# Patient Record
Sex: MALE | Race: OTHER | NOT HISPANIC OR LATINO | URBAN - METROPOLITAN AREA
[De-identification: names, ages, dates, MRNs, and addresses within clinical notes are randomized per-mention and may not be internally consistent; named-entity substitution may affect disease eponyms.]

---

## 2023-06-07 ENCOUNTER — OUTPATIENT (OUTPATIENT)
Dept: OUTPATIENT SERVICES | Facility: HOSPITAL | Age: 53
LOS: 1 days | End: 2023-06-07
Payer: COMMERCIAL

## 2023-06-07 DIAGNOSIS — K02.9 DENTAL CARIES, UNSPECIFIED: ICD-10-CM

## 2023-06-07 PROCEDURE — D0220: CPT

## 2023-06-07 PROCEDURE — D0140: CPT

## 2023-06-08 DIAGNOSIS — K02.9 DENTAL CARIES, UNSPECIFIED: ICD-10-CM

## 2023-08-29 ENCOUNTER — OUTPATIENT (OUTPATIENT)
Dept: OUTPATIENT SERVICES | Facility: HOSPITAL | Age: 53
LOS: 1 days | End: 2023-08-29

## 2023-08-29 DIAGNOSIS — K02.9 DENTAL CARIES, UNSPECIFIED: ICD-10-CM

## 2023-08-29 PROCEDURE — D0140: CPT

## 2023-08-29 PROCEDURE — D0330: CPT

## 2023-08-30 DIAGNOSIS — K02.9 DENTAL CARIES, UNSPECIFIED: ICD-10-CM

## 2024-04-18 ENCOUNTER — APPOINTMENT (OUTPATIENT)
Dept: PSYCHIATRY | Facility: CLINIC | Age: 54
End: 2024-04-18

## 2024-04-18 ENCOUNTER — OUTPATIENT (OUTPATIENT)
Dept: OUTPATIENT SERVICES | Facility: HOSPITAL | Age: 54
LOS: 1 days | End: 2024-04-18
Payer: MEDICAID

## 2024-04-18 DIAGNOSIS — F41.1 GENERALIZED ANXIETY DISORDER: ICD-10-CM

## 2024-04-18 PROBLEM — Z00.00 ENCOUNTER FOR PREVENTIVE HEALTH EXAMINATION: Status: ACTIVE | Noted: 2024-04-18

## 2024-04-18 PROCEDURE — 90853 GROUP PSYCHOTHERAPY: CPT

## 2024-04-18 PROCEDURE — 90791 PSYCH DIAGNOSTIC EVALUATION: CPT | Mod: XP
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
## 2024-04-19 DIAGNOSIS — F41.1 GENERALIZED ANXIETY DISORDER: ICD-10-CM

## 2024-04-22 NOTE — DISCUSSION/SUMMARY
[___ times a week] : [unfilled] time(s) a week [60 minutes] : 60 minutes [de-identified] : Intensive Outpatient Program [FreeTextEntry8] : To provide a supportive environment, develop healthy coping skills, which will aid in overall functioning and well being.  [FreeTextEntry4] : Behavior in group:  [x] Showed insight.  [x] Showed interest.  [x] Showed leadership.  [x] Active in discussion  [x] Offered constructive input.  [x] Supportive to others  [] Not supportive to others  [] No apparent interest  [] Quietly engaged.  [] Withdrawn  [] Appeared distracted.  [] Disruptive     INDIVIDUAL'S MOOD:  [] Stable  [x] Depressed/Sad  [] Angry  [x] Anxious  [] Other     NEW ISSUES/STRESSORS/EXTRAORDINARY EVENTS PRESENTED TODAY:  [] New issue resolved, No Update Required  [] New issue, Treatment Plan Update Required  [x] None Reported [de-identified] : Pt was seen today for IOP group in person, pt is axo4, calm, pleasant, and appears well groomed. Pt was active in group discussion and provided insight and support to fellow group members. Pt introduced himself to staff and fellow group members and shared personal experiences, struggles and his motivation to "pursue a healthier life."

## 2024-04-23 ENCOUNTER — APPOINTMENT (OUTPATIENT)
Dept: PSYCHIATRY | Facility: CLINIC | Age: 54
End: 2024-04-23

## 2024-04-25 ENCOUNTER — APPOINTMENT (OUTPATIENT)
Dept: PSYCHIATRY | Facility: CLINIC | Age: 54
End: 2024-04-25

## 2024-04-25 ENCOUNTER — OUTPATIENT (OUTPATIENT)
Dept: OUTPATIENT SERVICES | Facility: HOSPITAL | Age: 54
LOS: 1 days | End: 2024-04-25
Payer: MEDICAID

## 2024-04-25 DIAGNOSIS — F41.1 GENERALIZED ANXIETY DISORDER: ICD-10-CM

## 2024-04-25 PROCEDURE — 90853 GROUP PSYCHOTHERAPY: CPT | Mod: XP

## 2024-04-26 NOTE — DISCUSSION/SUMMARY
[___ times a week] : [unfilled] time(s) a week [60 minutes] : 60 minutes [de-identified] : Intensive Outpatient Program [FreeTextEntry8] : To provide a supportive environment, develop healthy coping skills, which will aid in overall functioning and well being. [FreeTextEntry4] : Behavior in group:  [x] Showed insight.  [x] Showed interest.  [] Showed leadership.  [x] Active in discussion  [x] Offered constructive input.  [x] Supportive to others  [] Not supportive to others  [] No apparent interest  [] Quietly engaged.  [] Withdrawn  [] Appeared distracted.  [] Disruptive     INDIVIDUAL'S MOOD:  [] Stable  [x] Depressed/Sad  [] Angry  [x] Anxious  [] Other     NEW ISSUES/STRESSORS/EXTRAORDINARY EVENTS PRESENTED TODAY:  [] New issue resolved, No Update Required  [] New issue, Treatment Plan Update Required  [x] None Reported    [de-identified] : Pt was seen today for IOP group in person, pt is axo4, calm, pleasant, and appears well groomed. Pt was active in group discussion and provided insight and support to fellow group members.

## 2024-04-30 ENCOUNTER — APPOINTMENT (OUTPATIENT)
Dept: PSYCHIATRY | Facility: CLINIC | Age: 54
End: 2024-04-30
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
## 2024-05-06 NOTE — FAMILY HISTORY
[FreeTextEntry1] : Family composition: [ 7motth old daughter, Gerri mother, 21-year-old son; 5 brothers 1 sister]  Family history and background [ Born in [place], [place], lived in [place] since 6 years old. ] Family relationship [ Poor Relationship] Pertinent Family Medical, MH and Substance Use History including Adult Child of Alcoholic and child of substance abuse status; history of cancer and heart disease [ History of bi-polar disorder on maternal side of family]

## 2024-05-06 NOTE — REASON FOR VISIT
[Community Based Organization] : Community Based Organization [Patient] : Patient [Collateral - Name/Contact Info/Relationship:___] : Collateral: [unfilled] [FreeTextEntry4] : 321lc [FreeTextEntry5] : 10am [FreeTextEntry2] : Depression, anger and Mood swings.   [FreeTextEntry1] :  support for feelings of depression, homelessness, and anger

## 2024-05-06 NOTE — SOCIAL HISTORY
[FreeTextEntry1] : Legal Status  Does individual Served have a Legal Guardian, rep Payee or Conservatorship? [ X] No [ ] Yes - Details: [ ]  Legal Involvement history Does the individual have a history of or current involvement with the legal system? [X ] No [ ] Yes - Details: [ ]   Services Have you ever served in the ? [X ] No [ ] Yes - Details: [ ]  Employment history [ last worked less than a year ]  Developmental history [ ]  Sexual hx/identity Sexual History/ Concern (include sexual orientation and other relevant information)  [ heterosexual male ]  Race - ethnicity - culture information [  American Iltialian ]   Social supports (friends, Volunteers, club, AA meeting, other meetings ) ? [ [facility]; ]  Meaningful Activities: [ Fishing, baseball; sports ]   Spiritual Assessment Tool - TIN LAWSON What is your vy or belief? [ Born again Episcopal] Do you consider yourself spiritual or Rastafari? [ Spiritual ] Is there something you believe in that gives meaning to your life? [ ] I: Is it important in your life? [ ] What influence does it have on how you take care of yourself? [ Try to stay positive, going to program for support] How have your beliefs influenced your behavior during this illness? What role do your beliefs play in regaining your health? [ ] C. Are you part of a spiritual or Rastafari community? [ ] Is this of support to you and how? [ ] Is there a person or group of people you really love or who are really important to you? [ all the girls in my family, daughter, child's mother and mother ] H. We have been discussing your belief and supports. What else gives you internal support?[ doing what's right for my family] What are your sources of hope, strength, comfort and peace? [ being humble and grateful for being alive dispte not having everything ] What do you hold on to during difficult times? [ Hold my breathe] what sustains you and keeps you going? [ belief in God and my family] A. How would you like me, your healthcare provider, to address these issues in your healthcare? [to help me get better ]  SMOKING CESSATION Do you Smoke?                              [ ] Yes [ X] No Do you want to quit? [ ] Yes [ ] No -ASK  Number of cigatettes   [ ] cigars [ ]  pipe bowls [ ]  per day  Number of ST cans/ pouches per week [ ]  Number of years used [ ]  How soon after you wake up do you use tobacco?  [ ] within 30 minutes      [ ] more than 30 minutes  Previous quit attempts:  # of attempts [ ] longest quit period [ ] methods(s) used [ ] How long ago was last attempt to quit  [ ] years [ ] months  Reasons for wanting to quit[ ] -ADVISE   about the oral benefits of quitting -ASSESS   willingness to make a quit attempt (Stage of Change)    [ ] Precontemplation (Stop here & Reassess next visit) [ ] Contemplation [ ] Preparation   -ASSIST    (depending on stage of change)  Self-Help Pamphlets & Materials  List of local community group/individual quit programs and phone help lines  Encourage a quit date (for those who are ready)  Pharmacotherapy: Nicotine gum/ Lozenge/ Patch/ Inhaler/NS/Zyban/ Chantix   RX: [ ]  () -ARRANGE  Follow up if set a quit date (with permission) Quit Date: [ ]  Phone calls or visits:  [ ] Week 1-2  Months   [ ] 1   [ ] 3   [ ] 6   [ ] 12   -Yearly Reassessment    [ ] No Changes of Smoking [ ] Change of Smoking Habit (Non-Smoker to Smoker/ Smoker to Non Smoker) (If there is change from Non Smoker to Smoker, please fill out new BRIEF TOBACCO CESSATION INTERVENTION FORM) Date of Yearly Reassessment : [ ] Comments:  [ ]
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
## 2024-05-07 ENCOUNTER — OUTPATIENT (OUTPATIENT)
Dept: OUTPATIENT SERVICES | Facility: HOSPITAL | Age: 54
LOS: 1 days | End: 2024-05-07
Payer: MEDICAID

## 2024-05-07 ENCOUNTER — APPOINTMENT (OUTPATIENT)
Dept: PSYCHIATRY | Facility: CLINIC | Age: 54
End: 2024-05-07

## 2024-05-07 DIAGNOSIS — F41.1 GENERALIZED ANXIETY DISORDER: ICD-10-CM

## 2024-05-07 PROCEDURE — 90853 GROUP PSYCHOTHERAPY: CPT

## 2024-05-07 NOTE — DISCUSSION/SUMMARY
[de-identified] : Intensive Outpatient Program [FreeTextEntry8] : To provide a supportive environment, develop healthy coping skills, which will aid in overall functioning and well being.   [FreeTextEntry4] : [x] Showed insight.  [x] Showed interest.  [x] Showed leadership.  [x] Active in discussion  [x] Offered constructive input.  [x] Supportive to others  [] Not supportive to others  [] No apparent interest  [] Quietly engaged.  [] Withdrawn  [] Appeared distracted.  [] Disruptive    INDIVIDUAL'S MOOD:  [] Stable  [x] Depressed/Sad  [] Angry  [x] Anxious  [] Other    NEW ISSUES/STRESSORS/EXTRAORDINARY EVENTS PRESENTED TODAY:  [] New issue resolved, No Update Required  [] New issue, Treatment Plan Update Required  [x] None Reported   [de-identified] : Robert participated in group while expressing himself in an appropriate manner.  Pt. spoke about his family history and current motivation to care for his daughter.  Robert was forthcoming with information and seemed receptive to feedback.

## 2024-05-07 NOTE — DISCUSSION/SUMMARY
[de-identified] : Intensive Outpatient Program [FreeTextEntry8] : To provide a supportive environment, develop healthy coping skills, which will aid in overall functioning and well-being. [FreeTextEntry4] : [x] Showed insight.  [x] Showed interest.  [x] Showed leadership.  [x] Active in discussion  [x] Offered constructive input.  [x] Supportive to others  [] Not supportive to others  [] No apparent interest  [] Quietly engaged.  [] Withdrawn  [] Appeared distracted.  [] Disruptive    INDIVIDUAL'S MOOD:  [] Stable  [x] Depressed/Sad  [] Angry  [x] Anxious  [] Other    NEW ISSUES/STRESSORS/EXTRAORDINARY EVENTS PRESENTED TODAY:  [] New issue resolved, No Update Required  [] New issue, Treatment Plan Update Required  [x] None Reported   [de-identified] : Robert participated in group and appeared calm during the discussion.  He did become emotional as he offered support to a group member with lack of motivation.  Pt. spoke about spiritual belief which help him to keep going.  no safety concerns reported.

## 2024-05-08 DIAGNOSIS — F41.1 GENERALIZED ANXIETY DISORDER: ICD-10-CM

## 2024-05-09 ENCOUNTER — APPOINTMENT (OUTPATIENT)
Dept: PSYCHIATRY | Facility: CLINIC | Age: 54
End: 2024-05-09

## 2024-05-09 ENCOUNTER — APPOINTMENT (OUTPATIENT)
Dept: PSYCHIATRY | Facility: CLINIC | Age: 54
End: 2024-05-09
Payer: MEDICAID

## 2024-05-09 ENCOUNTER — OUTPATIENT (OUTPATIENT)
Dept: OUTPATIENT SERVICES | Facility: HOSPITAL | Age: 54
LOS: 1 days | End: 2024-05-09
Payer: MEDICAID

## 2024-05-09 DIAGNOSIS — F31.81 BIPOLAR II DISORDER: ICD-10-CM

## 2024-05-09 DIAGNOSIS — F39 UNSPECIFIED MOOD [AFFECTIVE] DISORDER: ICD-10-CM

## 2024-05-09 DIAGNOSIS — F31.9 BIPOLAR DISORDER, UNSPECIFIED: ICD-10-CM

## 2024-05-09 DIAGNOSIS — F41.1 GENERALIZED ANXIETY DISORDER: ICD-10-CM

## 2024-05-09 PROCEDURE — 90792 PSYCH DIAG EVAL W/MED SRVCS: CPT | Mod: 25

## 2024-05-09 PROCEDURE — 90792 PSYCH DIAG EVAL W/MED SRVCS: CPT

## 2024-05-09 PROCEDURE — 90853 GROUP PSYCHOTHERAPY: CPT

## 2024-05-09 NOTE — DISCUSSION/SUMMARY
[___ times a week] : [unfilled] time(s) a week [60 minutes] : 60 minutes [de-identified] : Intensive Outpatient Program   [FreeTextEntry8] : To provide a supportive environment, conducive to developing healthy coping strategies, that will assist with overall functioning and wellbeing. CBT/DBT methods utilized.  [FreeTextEntry4] :  [X] Showed insight.  [X] Showed interest.  [] Showed leadership.  [X] Active in discussion  [] Offered constructive input.  [X] Supportive to others  [] Not supportive to others  [] No apparent interest  [] Quietly engaged.  [] Withdrawn  [] Appeared distracted.  [] Disruptive  INDIVIDUAL'S MOOD:  [] Stable  [x] Depressed/Sad  [] Angry  [X] Anxious  [] Other    NEW ISSUES/STRESSORS/EXTRAORDINARY EVENTS PRESENTED TODAY:  [] New issue resolved, No Update Required  [] New issue, Treatment Plan Update Required  [X] None Reported, [de-identified] : Robert attended IOP group this morning after MD appointment. He appeared well groomed and engaged. He was active in group topic on interpersonal communication skills and engaged in role play exercise with peers, identifying different communication styles. He shared feeling frustrated with his housing situation. However, was able to verbalize the basic needs of food and shelter as a priority, recognizing "I want to have a relationship with my daughter". He was receptive to peer support and feedback.

## 2024-05-10 DIAGNOSIS — F31.81 BIPOLAR II DISORDER: ICD-10-CM

## 2024-05-10 PROBLEM — F31.9 BIPOLAR DISORDER WITHOUT PSYCHOTIC FEATURES: Status: RESOLVED | Noted: 2024-05-09 | Resolved: 2024-05-10

## 2024-05-10 PROBLEM — F39 MOOD DISORDER: Status: RESOLVED | Noted: 2024-05-10 | Resolved: 2024-05-10

## 2024-05-10 NOTE — PLAN
[Admit to Program     (Add Program Admission information to a new column in the Admit/Discharge Flowsheet)] : Admit to program [Every ___ week(s)] : Psychotherapy: Every [unfilled] week(s) [Individual Therapy] : Individual Therapy [Group Therapy] : Group Therapy

## 2024-05-14 ENCOUNTER — APPOINTMENT (OUTPATIENT)
Dept: PSYCHIATRY | Facility: CLINIC | Age: 54
End: 2024-05-14

## 2024-05-21 ENCOUNTER — APPOINTMENT (OUTPATIENT)
Dept: PSYCHIATRY | Facility: CLINIC | Age: 54
End: 2024-05-21

## 2024-05-23 ENCOUNTER — APPOINTMENT (OUTPATIENT)
Dept: PSYCHIATRY | Facility: CLINIC | Age: 54
End: 2024-05-23

## 2024-05-23 ENCOUNTER — OUTPATIENT (OUTPATIENT)
Dept: OUTPATIENT SERVICES | Facility: HOSPITAL | Age: 54
LOS: 1 days | End: 2024-05-23
Payer: MEDICAID

## 2024-05-23 ENCOUNTER — OUTPATIENT (OUTPATIENT)
Dept: OUTPATIENT SERVICES | Facility: HOSPITAL | Age: 54
LOS: 1 days | End: 2024-05-23

## 2024-05-23 DIAGNOSIS — F31.81 BIPOLAR II DISORDER: ICD-10-CM

## 2024-05-23 DIAGNOSIS — F31.9 BIPOLAR DISORDER, UNSPECIFIED: ICD-10-CM

## 2024-05-23 LAB — VALPROATE SERPL-MCNC: 91 UG/ML

## 2024-05-23 PROCEDURE — 90853 GROUP PSYCHOTHERAPY: CPT | Mod: XP

## 2024-05-24 DIAGNOSIS — F31.9 BIPOLAR DISORDER, UNSPECIFIED: ICD-10-CM

## 2024-05-24 DIAGNOSIS — F31.81 BIPOLAR II DISORDER: ICD-10-CM

## 2024-05-24 NOTE — DISCUSSION/SUMMARY
[___ times a week] : [unfilled] time(s) a week [60 minutes] : 60 minutes [de-identified] : IOP Group - Advocacy and Communication Skills [FreeTextEntry8] : The focus of the group today was on advocating for needs in healthcare and strategies for doing so. The group discussed their own communication skills that they have had success with and shared this with one another. We discussed the importance of looking at the source of information, the value of second opinions, and taking an active role in your healthcare. The significance of prioritizing your own mental health was a topic of discussion in the context of dealing with rude/insensitive people or providers. We talked grounding strategies to relieve anxiety and stress in these predicaments. Overall, group members related to one another during the session.  [FreeTextEntry4] : Patient exhibited participation and shared their experience with the mental health system and healthcare in general. They provided feedback to other group members and their experience contributed to the overall picture. They validated others in terms of second opinions, advocating, focusing on what you can control, and prioritizing yourself.  [de-identified] : Patient is enrolled in IOP program which consists of two days per week with two groups conducted on those days. Length of program for each participant is around 6 weeks.

## 2024-05-28 ENCOUNTER — APPOINTMENT (OUTPATIENT)
Dept: PSYCHIATRY | Facility: CLINIC | Age: 54
End: 2024-05-28
Payer: MEDICAID

## 2024-05-28 ENCOUNTER — OUTPATIENT (OUTPATIENT)
Dept: OUTPATIENT SERVICES | Facility: HOSPITAL | Age: 54
LOS: 1 days | End: 2024-05-28
Payer: MEDICAID

## 2024-05-28 DIAGNOSIS — F31.81 BIPOLAR II DISORDER: ICD-10-CM

## 2024-05-28 DIAGNOSIS — F79 UNSPECIFIED INTELLECTUAL DISABILITIES: ICD-10-CM

## 2024-05-28 DIAGNOSIS — F63.81 INTERMITTENT EXPLOSIVE DISORDER: ICD-10-CM

## 2024-05-28 DIAGNOSIS — F31.9 BIPOLAR DISORDER, UNSPECIFIED: ICD-10-CM

## 2024-05-28 PROCEDURE — ZZZZZ: CPT

## 2024-05-28 PROCEDURE — 99212 OFFICE O/P EST SF 10 MIN: CPT
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
## 2024-05-28 NOTE — END OF VISIT
[] : Resident [FreeTextEntry3] : Pt is seen and evaluated with resident. Agree with the written above. Pt's previous psych provider was contacted for collateral information. Treatment plan was discussed.

## 2024-05-28 NOTE — PHYSICAL EXAM
[Average] : average [Well groomed] : well groomed [Cooperative] : cooperative [Euthymic] : euthymic [Labile] : labile [Loud] : loud [Pressured] : pressured [Overproductive] : overproductive [Circumstantial] : circumstantial [[place]] : concrete [None] : none [None Reported] : none reported [WNL] : within normal limits [Ability to abstract] : ability to abstract [MR] : MR [FreeTextEntry1] : [place] [de-identified] : some limitations [de-identified] : some limitations
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
## 2024-05-28 NOTE — REASON FOR VISIT
[Number can be texted] : number can be texted [OK  to leave message] : OK  to leave message [Community Based Organization] : Community Based Organization [Non-[facility] Health Provider/Facility] : Non-[facility] Health Provider/Facility [Patient] : Patient [FreeTextEntry5] : English [FreeTextEntry6] : Robert [FreeTextEntry7] : he/him [FreeTextEntry2] : referral for med management and higher level of care (IOP) after pt's case was closed at [facility] [FreeTextEntry1] : referral for med management and higher level of care (IOP) after pt's case was closed at [facility]

## 2024-05-28 NOTE — RISK ASSESSMENT
[Clinical Records] : Clinical Records [Clinical Interview] : Clinical Interview [Collateral Sources] : Collateral Sources [No] : No [Mood disorder] : mood disorder [Psychotic disorder] : psychotic disorder [Alcohol/Substance Use disorders] : alcohol/substance use disorders [History of abuse/trauma] : history of abuse/trauma [History of Impulsivity] : history of impulsivity [Family Hx of psychiatric diagnoses requiring hospitalization] : family history of psychiatric diagnoses requiring hospitalization [Non-compliant or not receiving treatment] : non-compliant or not receiving treatment [Change in provider or treatment (i.e., medications, psychotherapy, milieu)] : change in provider or treatment (i.e., medications, psychotherapy, milieu) [Hopeless about or dissatisfied with provider or treatment] : hopeless about or dissatisfied with provider or treatment [Triggering events leading to humiliation, shame, and/or despair] : triggering events leading to humiliation, shame, and/or despair (e.g. loss of relationship, financial or health status) (real or anticipated) [Legal problems] : legal problems [Substance intoxication/withdrawal] : substance intoxication or withdrawal [Identifies reasons for living] : identifies reasons for living [Roman Catholic beliefs] : Judaism beliefs [Cultural, spiritual and/or moral attitudes against suicide] : cultural, spiritual and/or moral attitudes against suicide [Responsibility to children, family, or others] : responsibility to children, family, or others [Fear of death/actual act of killing self] : fear of death or the actual act of killing self [Yes (details below)] : yes [None Known] : none known [Yes, more than 3 months ago] : yes, more than 3 months ago [Hx of being victimized/traumatized] : history of being victimized/traumatized [History of violence prior to age 18] : history of violence prior to age 18 [Substance abuse] : substance abuse [Non-adherence with treatment] : non-adherence with treatment [Community stressors that increase the risk of destabilization] : community stressors that increase the risk of destabilization [Affective dysregulation] : affective dysregulation [Impulsivity] : impulsivity [Irritability] : irritability [Cognitive impairment] : cognitive impairment [Relationship stability] : relationship stability [Insight into violence risk and need for management/treatment] : insight into violence risk and need for management/treatment [Yes] : yes [FreeTextEntry6] : feels secure in his ability to keep himself safe [FreeTextEntry4] : see HPI [FreeTextEntry5] : see HPI [de-identified] : no access to weapons reported
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
## 2024-05-29 DIAGNOSIS — F79 UNSPECIFIED INTELLECTUAL DISABILITIES: ICD-10-CM

## 2024-05-29 DIAGNOSIS — F31.9 BIPOLAR DISORDER, UNSPECIFIED: ICD-10-CM

## 2024-05-29 DIAGNOSIS — F63.81 INTERMITTENT EXPLOSIVE DISORDER: ICD-10-CM

## 2024-05-30 ENCOUNTER — APPOINTMENT (OUTPATIENT)
Dept: PSYCHIATRY | Facility: CLINIC | Age: 54
End: 2024-05-30

## 2024-06-03 NOTE — DISCUSSION/SUMMARY
[___ times a week] : [unfilled] time(s) a week [60 minutes] : 60 minutes [de-identified] : Intensive Outpatient Program  [FreeTextEntry8] :  To provide a supportive environment, conducive to developing healthy coping strategies, that will assist with overall functioning and wellbeing.  [FreeTextEntry4] : Behavior in group:  [] Showed insight.  [] Showed interest.  [] Showed leadership.  [] Active in discussion  [] Offered constructive input.  [] Supportive to others  [] Not supportive to others  [x] No apparent interest  [] Quietly engaged.  [] Withdrawn  [] Appeared distracted.  [] Disruptive    INDIVIDUAL'S MOOD:  [] Stable  [x] Depressed/Sad  [] Angry  [x] Anxious  [] Other    NEW ISSUES/STRESSORS/EXTRAORDINARY EVENTS PRESENTED TODAY:  [] New issue resolved, No Update Required  [] New issue, Treatment Plan Update Required  [x] None Reported   [de-identified] : Robert participated in Group Discussion and expressed himself in an appropriate manner.  He spoke about establishing his needs in relationships.  He offered support to other group members.

## 2024-06-03 NOTE — DISCUSSION/SUMMARY
[___ times a week] : [unfilled] time(s) a week [60 minutes] : 60 minutes [de-identified] : Intensive Outpatient Program [FreeTextEntry8] :  To provide a supportive environment, conducive to developing healthy coping strategies, that will assist with overall functioning and wellbeing.  [FreeTextEntry4] : Behavior in group:  [] Showed insight.  [] Showed interest.  [] Showed leadership.  [] Active in discussion  [] Offered constructive input.  [] Supportive to others  [] Not supportive to others  [x] No apparent interest  [] Quietly engaged.  [] Withdrawn  [] Appeared distracted.  [] Disruptive    INDIVIDUAL'S MOOD:  [] Stable  [x] Depressed/Sad  [] Angry  [x] Anxious  [] Other    NEW ISSUES/STRESSORS/EXTRAORDINARY EVENTS PRESENTED TODAY:  [] New issue resolved, No Update Required  [] New issue, Treatment Plan Update Required  [x] None Reported   [de-identified] : Robert participated in group discussion today.  He spoke about the importance of taking care of his daughter and establishing boundaries in personal relationships.  He seemed open to feedback from group members.

## 2024-06-03 NOTE — DISCUSSION/SUMMARY
[___ times a week] : [unfilled] time(s) a week [60 minutes] : 60 minutes [de-identified] : Intensive Outpatient Program [FreeTextEntry8] :  To provide a supportive environment, conducive to developing healthy coping strategies, that will assist with overall functioning and wellbeing.   [FreeTextEntry4] : Behavior in group:  [] Showed insight.  [] Showed interest.  [] Showed leadership.  [] Active in discussion  [] Offered constructive input.  [] Supportive to others  [] Not supportive to others  [x] No apparent interest  [] Quietly engaged.  [] Withdrawn  [] Appeared distracted.  [] Disruptive    INDIVIDUAL'S MOOD:  [] Stable  [x] Depressed/Sad  [] Angry  [x] Anxious  [] Other    NEW ISSUES/STRESSORS/EXTRAORDINARY EVENTS PRESENTED TODAY:  [] New issue resolved, No Update Required  [] New issue, Treatment Plan Update Required  [x] None Reported   [de-identified] : Robert participated in group today and expressed himself in an appropriate manner.  He spoke about managing feelings of anxiety and depression with his spiritual belief.  Pt. was receptive to feedback from peers in group.

## 2024-06-04 ENCOUNTER — APPOINTMENT (OUTPATIENT)
Dept: PSYCHIATRY | Facility: CLINIC | Age: 54
End: 2024-06-04

## 2024-06-11 ENCOUNTER — OUTPATIENT (OUTPATIENT)
Dept: OUTPATIENT SERVICES | Facility: HOSPITAL | Age: 54
LOS: 1 days | End: 2024-06-11
Payer: MEDICAID

## 2024-06-11 ENCOUNTER — APPOINTMENT (OUTPATIENT)
Dept: PSYCHIATRY | Facility: CLINIC | Age: 54
End: 2024-06-11

## 2024-06-11 DIAGNOSIS — F31.9 BIPOLAR DISORDER, UNSPECIFIED: ICD-10-CM

## 2024-06-11 DIAGNOSIS — F31.81 BIPOLAR II DISORDER: ICD-10-CM

## 2024-06-11 PROCEDURE — 90853 GROUP PSYCHOTHERAPY: CPT

## 2024-06-11 NOTE — DISCUSSION/SUMMARY
[___ times a week] : [unfilled] time(s) a week [Other: ____] : [unfilled] [de-identified] : Intensive Outpatient Program.   [FreeTextEntry8] : To provide a supportive environment, conducive to developing healthy coping strategies, that will assist with overall functioning and wellbeing. CBT/DBT methods utilized.   [FreeTextEntry4] :  [X] Showed insight.  [X] Showed interest.  [] Showed leadership.  [X] Active in discussion  [] Offered constructive input.  [X] Supportive to others  [] Not supportive to others  [] No apparent interest  [] Quietly engaged.  [] Withdrawn  [] Appeared distracted.  [] Disruptive    INDIVIDUAL'S MOOD:  [] Stable  [x] Depressed/Sad  [] Angry  [X] Anxious  [] Other    NEW ISSUES/STRESSORS/EXTRAORDINARY EVENTS PRESENTED TODAY:  [] New issue resolved, No Update Required  [] New issue, Treatment Plan Update Required  [X] None Reported   [de-identified] : Robert attended IOP group this morning, he required some redirection during group and was receptive to it. He shared challenges with homelessness and not being able to see his daughter. He reports that he is now engaging in a social club house for additional support, which he seemed happy about. He made efforts to provide support to his peers.

## 2024-06-12 ENCOUNTER — APPOINTMENT (OUTPATIENT)
Dept: PSYCHIATRY | Facility: CLINIC | Age: 54
End: 2024-06-12

## 2024-06-12 ENCOUNTER — NON-APPOINTMENT (OUTPATIENT)
Age: 54
End: 2024-06-12

## 2024-06-12 DIAGNOSIS — F31.9 BIPOLAR DISORDER, UNSPECIFIED: ICD-10-CM

## 2024-06-12 DIAGNOSIS — F31.81 BIPOLAR II DISORDER: ICD-10-CM

## 2024-06-12 NOTE — DISCUSSION/SUMMARY
[___ times a week] : [unfilled] time(s) a week [45 - 60 minutes] : 45 - 60 minutes [de-identified] : Intensive Outpatient Program  [FreeTextEntry8] : The group session started at 12pm and ended at 1pm. To provide a supportive environment, conducive to developing healthy coping strategies, that will assist with overall functioning and wellbeing. CBT/DBT methods utilized. [FreeTextEntry4] : Patient Participation/Progress: [X] Showed insight.  [X] Showed interest.  [] Showed leadership.  [X] Active in discussion  [] Offered constructive input.  [X] Supportive to others  [] Not supportive to others  [] No apparent interest  [] Quietly engaged.  [] Withdrawn  [] Appeared distracted.  [] Disruptive  INDIVIDUAL'S MOOD:  [] Stable  [X] Depressed/Sad  [] Angry  [] Anxious  [] Other [de-identified] : NEW ISSUES/STRESSORS/EXTRAORDINARY EVENTS PRESENTED TODAY:  [] New issue resolved, No Update Required  [] New issue, Treatment Plan Update Required  [X] None Reported, Discussion with collaborating staff occurred since last visit.   Details: Patient presented in person for IOP group this morning, he appeared well groomed.

## 2024-06-12 NOTE — HISTORY OF PRESENT ILLNESS
[FreeTextEntry2] : Pt presents in person, accompanied by his care manager, Juan C Hill. Total visit time: 15 min.   Pt appears calm, euthymic and in good behavioral control. He remains somewhat tangential but is more linear and goal-oriented today. Reports he enjoyed the [employer] this past weekend and spent time with his daughter and his daughter's mother, Kamryn. Pt reports they are thinking of getting  in 2 weeks. He reports they are in love and that this may help them get an apartment together; reports she also has an intellectual disability.   Pt reports stable mood; his VPA level from 5/23/24 was 91.0, but reports he took his depakote early in the morning prior to his blood draw. Denies Sx of toxicity or AE. Feels comfortable continuing this dose. Denies Sx of tiffany, depression or psychosis. Is agreeable to trial abilify po in order to start abilify maintenna injection; RAB discussed. Printed medication directions for the pt. He reports that Juan C fills his pill box for him and assists in picking up medications.  [FreeTextEntry3] : PPH of schizoaffective disorder Bipolar type, Cannabis use disorder, recent approval for OPWDD services, reportedly 2 [facility] admissions, one at age 18 at [facility] for 1 month, and second at [facility] in 2015 (note not available on EHR). Pt has a  Vanita Moreno [phone] from [employer] [place], attends [employer] daily, and has PMH of mild intellectual disability. He presents to our clinic at referral of HARINDER Benoit after pt's chart was closed at [facility] due to nonadherence with visits. Pt was deemed to require a higher level of care and was referred to IOP at [facility] and presents today for an intake.   Pt was hospitalized at [facility] at age 18 after getting high at [facility] (where he was staying) and began feeling paranoid, persecuted, slept poorly, and threw a garbage can. He was arrested and admitted; reports thinking the staff would kill him. He states he became dystonic on an IM med, but cannot recall the name. He reports being on prolixin (denies EPS or AE to this) and cogentin, but does not understand why he was on prolixin. Reports he was hospitalized again in 2015 at [facility] (unit no longer exists) and reports it was for similar presentation. He reports episodes of severe depression and "tiffany" when not intoxicated, however the manic periods do not appear to be sustained or "unprovoked." He largely describes provoked altercations and physical aggression. Denies periods of grandiosity or sleep deprivation when not high. He also reports periods of "blacking out" unrelated to substance use. Denies a seizure hx.   Spoke to HARINDER Benoit, [phone], for collateral: reports he treated the pt for approx 1 year and he was stable on depakote 500 BID. However suspects periods of noncompliance as the pt missed 3 visits and was terminated per clinic policy. Agrees that pt likely does not have a primary psychotic disorder, but an affective disorder complicated by intellectual impairment and poor impulse control. He reports considering a TOTH, such as Abilify, for him due to noncompliance. Also suggests naltrexone for marijuana cessation. Corroborates account of "black outs" and states the pt never saw neurology to his memory. Reports VPA was meant to address mood and potential seizures.

## 2024-06-12 NOTE — RISK ASSESSMENT
[Clinical Records] : Clinical Records [Clinical Interview] : Clinical Interview [Collateral Sources] : Collateral Sources [No] : No [Mood disorder] : mood disorder [Psychotic disorder] : psychotic disorder [Alcohol/Substance Use disorders] : alcohol/substance use disorders [History of abuse/trauma] : history of abuse/trauma [History of Impulsivity] : history of impulsivity [Family Hx of psychiatric diagnoses requiring hospitalization] : family history of psychiatric diagnoses requiring hospitalization [Non-compliant or not receiving treatment] : non-compliant or not receiving treatment [Change in provider or treatment (i.e., medications, psychotherapy, milieu)] : change in provider or treatment (i.e., medications, psychotherapy, milieu) [Hopeless about or dissatisfied with provider or treatment] : hopeless about or dissatisfied with provider or treatment [Triggering events leading to humiliation, shame, and/or despair] : triggering events leading to humiliation, shame, and/or despair (e.g. loss of relationship, financial or health status) (real or anticipated) [Legal problems] : legal problems [Substance intoxication/withdrawal] : substance intoxication or withdrawal [Identifies reasons for living] : identifies reasons for living [Baptism beliefs] : Anabaptism beliefs [Cultural, spiritual and/or moral attitudes against suicide] : cultural, spiritual and/or moral attitudes against suicide [Responsibility to children, family, or others] : responsibility to children, family, or others [Fear of death/actual act of killing self] : fear of death or the actual act of killing self [Yes (details below)] : yes [None Known] : none known [Yes, more than 3 months ago] : yes, more than 3 months ago [Hx of being victimized/traumatized] : history of being victimized/traumatized [History of violence prior to age 18] : history of violence prior to age 18 [Substance abuse] : substance abuse [Non-adherence with treatment] : non-adherence with treatment [Community stressors that increase the risk of destabilization] : community stressors that increase the risk of destabilization [Affective dysregulation] : affective dysregulation [Impulsivity] : impulsivity [Irritability] : irritability [Cognitive impairment] : cognitive impairment [Relationship stability] : relationship stability [Insight into violence risk and need for management/treatment] : insight into violence risk and need for management/treatment [No, patient denies ideation or behavior] : No, patient denies ideation or behavior [Low acute suicide risk] : Low acute suicide risk [Yes] : Safety Plan completed/updated (for individuals at risk): Yes [FreeTextEntry6] : feels secure in his ability to keep himself safe [FreeTextEntry4] : see HPI [FreeTextEntry5] : see HPI [de-identified] : no access to weapons reported [FreeTextEntry3] : Call care giver or . Call therapist or provider. Call 988 hotline or 911 or report to nearest ER.

## 2024-06-12 NOTE — PHYSICAL EXAM
[Well groomed] : well groomed [Average] : average [Cooperative] : cooperative [Euthymic] : euthymic [Overproductive] : overproductive [[place]] : concrete [None] : none [None Reported] : none reported [WNL] : within normal limits [Ability to abstract] : ability to abstract [MR] : MR [Full] : full [Linear/Goal Directed] : linear/goal directed [FreeTextEntry1] : [place] [FreeTextEntry6] : a times, tangential [de-identified] : some limitations [de-identified] : some limitations

## 2024-06-12 NOTE — RESULTS/DATA
[FreeTextEntry1] : 5/23/24 VPA level: 91.0 (reports taking morning dose prior to lab draw; denies Sx of toxicity or AE).

## 2024-06-12 NOTE — FAMILY HISTORY
[FreeTextEntry1] : Reports bipolar in "all 5 of his brothers" and suspects undiagnosed bipolar in his father.

## 2024-06-13 ENCOUNTER — OUTPATIENT (OUTPATIENT)
Dept: OUTPATIENT SERVICES | Facility: HOSPITAL | Age: 54
LOS: 1 days | End: 2024-06-13
Payer: MEDICAID

## 2024-06-13 ENCOUNTER — APPOINTMENT (OUTPATIENT)
Dept: PSYCHIATRY | Facility: CLINIC | Age: 54
End: 2024-06-13

## 2024-06-13 ENCOUNTER — OUTPATIENT (OUTPATIENT)
Dept: OUTPATIENT SERVICES | Facility: HOSPITAL | Age: 54
LOS: 1 days | End: 2024-06-13

## 2024-06-13 DIAGNOSIS — F31.81 BIPOLAR II DISORDER: ICD-10-CM

## 2024-06-13 DIAGNOSIS — F31.9 BIPOLAR DISORDER, UNSPECIFIED: ICD-10-CM

## 2024-06-13 PROCEDURE — 90853 GROUP PSYCHOTHERAPY: CPT | Mod: XP

## 2024-06-13 NOTE — DISCUSSION/SUMMARY
[___ times a week] : [unfilled] time(s) a week [60 minutes] : 60 minutes [de-identified] : Intensive Outpatient Program [FreeTextEntry8] : To provide a supportive environment, develop healthy coping skills, which will aid in overall functioning and well-being.  [FreeTextEntry4] : Behavior in group:  [x] Showed insight.  [x] Showed interest.  [] Showed leadership.  [x] Active in discussion  [x] Offered constructive input.  [] Supportive to others  [] Not supportive to others  [] No apparent interest  [] Quietly engaged.  [] Withdrawn  [] Appeared distracted.  [x] Disruptive     INDIVIDUAL'S MOOD:  [] Stable  [x] Depressed/Sad  [] Angry  [x] Anxious  [] Other     NEW ISSUES/STRESSORS/EXTRAORDINARY EVENTS PRESENTED TODAY:  [] New issue resolved, No Update Required  [] New issue, Treatment Plan Update Required  [x] None Reported [de-identified] : Pt was seen today for IOP group in person, pt is axo4, calm, and appears well groomed. Pt was active in group discussion, needed some redirection. Pt expressed the frustrations he has faced associated with having a new doctor.

## 2024-06-14 DIAGNOSIS — F31.9 BIPOLAR DISORDER, UNSPECIFIED: ICD-10-CM

## 2024-06-14 DIAGNOSIS — F31.81 BIPOLAR II DISORDER: ICD-10-CM

## 2024-06-14 NOTE — DISCUSSION/SUMMARY
[___ times a week] : [unfilled] time(s) a week [45 - 60 minutes] : 45 - 60 minutes [de-identified] : Intensive Outpatient Program  [FreeTextEntry8] : The group started at 12pm and ended at 1pm. To provide a supportive environment, conducive to developing healthy coping strategies, that will assist with overall functioning and wellbeing. CBT/DBT methods utilized.  [FreeTextEntry4] : [X] Showed insight.  [X] Showed interest.  [] Showed leadership.  [X] Active in discussion  [] Offered constructive input.  [X] Supportive to others  [] Not supportive to others  [] No apparent interest  [] Quietly engaged.  [] Withdrawn  [] Appeared distracted.  [] Disruptive  INDIVIDUAL'S MOOD:  [X] Stable  [] Depressed/Sad  [] Angry  [] Anxious  [] Other [de-identified] : NEW ISSUES/STRESSORS/EXTRAORDINARY EVENTS PRESENTED TODAY:  [] New issue resolved, No Update Required  [] New issue, Treatment Plan Update Required  [X] None Reported, Discussion with collaborating staff occurred since last visit.   Details: Patient presented in person for IOP group this morning, he appeared well groomed.

## 2024-06-16 ENCOUNTER — NON-APPOINTMENT (OUTPATIENT)
Age: 54
End: 2024-06-16

## 2024-06-18 ENCOUNTER — OUTPATIENT (OUTPATIENT)
Dept: OUTPATIENT SERVICES | Facility: HOSPITAL | Age: 54
LOS: 1 days | End: 2024-06-18
Payer: MEDICAID

## 2024-06-18 ENCOUNTER — APPOINTMENT (OUTPATIENT)
Dept: PSYCHIATRY | Facility: CLINIC | Age: 54
End: 2024-06-18

## 2024-06-18 DIAGNOSIS — F63.81 INTERMITTENT EXPLOSIVE DISORDER: ICD-10-CM

## 2024-06-18 DIAGNOSIS — F31.81 BIPOLAR II DISORDER: ICD-10-CM

## 2024-06-18 DIAGNOSIS — F79 UNSPECIFIED INTELLECTUAL DISABILITIES: ICD-10-CM

## 2024-06-18 DIAGNOSIS — F31.9 BIPOLAR DISORDER, UNSPECIFIED: ICD-10-CM

## 2024-06-18 PROCEDURE — 90853 GROUP PSYCHOTHERAPY: CPT

## 2024-06-18 RX ORDER — ARIPIPRAZOLE 10 MG/1
10 TABLET ORAL DAILY
Qty: 30 | Refills: 0 | Status: ACTIVE | COMMUNITY
Start: 2024-05-28 | End: 1900-01-01

## 2024-06-18 RX ORDER — DIVALPROEX SODIUM 500 MG/1
500 TABLET, DELAYED RELEASE ORAL
Qty: 60 | Refills: 0 | Status: ACTIVE | COMMUNITY
Start: 2024-05-09 | End: 1900-01-01

## 2024-06-18 NOTE — DISCUSSION/SUMMARY
[___ times a week] : [unfilled] time(s) a week [Other: ____] : [unfilled] [de-identified] : Intensive Outpatient Program   [FreeTextEntry8] : To provide a supportive environment, conducive to developing healthy coping strategies, that will assist with overall functioning and wellbeing. CBT/DBT methods utilized.   [FreeTextEntry4] : [] Showed insight.  [] Showed interest.  [] Showed leadership.  [] Active in discussion  [] Offered constructive input.  [] Supportive to others  [] Not supportive to others  [] No apparent interest  [] Quietly engaged.  [] Withdrawn  [x] Appeared distracted.  [x] Disruptive  INDIVIDUAL'S MOOD:  [] Stable  [x] Depressed/Sad  [x] Angry  [X] Anxious  [] Other    NEW ISSUES/STRESSORS/EXTRAORDINARY EVENTS PRESENTED TODAY:  [] New issue resolved, No Update Required  [] New issue, Treatment Plan Update Required  [X] None Reported,  [de-identified] : Robert attended IOP group late this morning,he seemed distracted,irritated that there was no coffee or crackers today. Robert required some redirection,was given some crackers and he returned to group. SMART goals reviewed as well as behavioral activation-practicing certain behaviors to activate a positive emotional state. Strategies and coping skills to activate one's behavior explored with group.

## 2024-06-19 DIAGNOSIS — F63.81 INTERMITTENT EXPLOSIVE DISORDER: ICD-10-CM

## 2024-06-19 DIAGNOSIS — F31.81 BIPOLAR II DISORDER: ICD-10-CM

## 2024-06-19 DIAGNOSIS — F79 UNSPECIFIED INTELLECTUAL DISABILITIES: ICD-10-CM

## 2024-06-19 DIAGNOSIS — F31.9 BIPOLAR DISORDER, UNSPECIFIED: ICD-10-CM

## 2024-06-20 ENCOUNTER — APPOINTMENT (OUTPATIENT)
Dept: PSYCHIATRY | Facility: CLINIC | Age: 54
End: 2024-06-20

## 2024-06-24 ENCOUNTER — NON-APPOINTMENT (OUTPATIENT)
Age: 54
End: 2024-06-24

## 2024-07-09 ENCOUNTER — OUTPATIENT (OUTPATIENT)
Dept: OUTPATIENT SERVICES | Facility: HOSPITAL | Age: 54
LOS: 1 days | End: 2024-07-09
Payer: MEDICAID

## 2024-07-09 ENCOUNTER — APPOINTMENT (OUTPATIENT)
Dept: PSYCHIATRY | Facility: CLINIC | Age: 54
End: 2024-07-09
Payer: MEDICAID

## 2024-07-09 DIAGNOSIS — F63.81 INTERMITTENT EXPLOSIVE DISORDER: ICD-10-CM

## 2024-07-09 DIAGNOSIS — F79 UNSPECIFIED INTELLECTUAL DISABILITIES: ICD-10-CM

## 2024-07-09 DIAGNOSIS — F33.1 MAJOR DEPRESSIVE DISORDER, RECURRENT, MODERATE: ICD-10-CM

## 2024-07-09 PROCEDURE — ZZZZZ: CPT

## 2024-07-09 PROCEDURE — 99213 OFFICE O/P EST LOW 20 MIN: CPT

## 2024-07-10 DIAGNOSIS — F33.1 MAJOR DEPRESSIVE DISORDER, RECURRENT, MODERATE: ICD-10-CM

## 2024-07-26 ENCOUNTER — APPOINTMENT (OUTPATIENT)
Dept: PSYCHIATRY | Facility: CLINIC | Age: 54
End: 2024-07-26

## 2024-08-02 ENCOUNTER — APPOINTMENT (OUTPATIENT)
Dept: PSYCHIATRY | Facility: CLINIC | Age: 54
End: 2024-08-02

## 2024-08-02 ENCOUNTER — NON-APPOINTMENT (OUTPATIENT)
Age: 54
End: 2024-08-02

## 2024-08-16 ENCOUNTER — NON-APPOINTMENT (OUTPATIENT)
Age: 54
End: 2024-08-16

## 2024-08-16 NOTE — DISCUSSION/SUMMARY
[FreeTextEntry1] : Patient (or patient's coordinator) contacted at [phone] with no response. Voicemail left with call back number asking for reschedule or update on patient status.

## 2024-08-26 ENCOUNTER — NON-APPOINTMENT (OUTPATIENT)
Age: 54
End: 2024-08-26

## 2024-08-26 NOTE — DISCUSSION/SUMMARY
[FreeTextEntry1] : Attempts were made to reach patient or those who know patient. Cell number in chart [phone] was reported to be a wrong number. [phone], which would usually go to the patient's care coordinator, had no response. Voicemail left with call back number asking for reschedule or update on patient status.

## 2024-11-05 ENCOUNTER — NON-APPOINTMENT (OUTPATIENT)
Age: 54
End: 2024-11-05